# Patient Record
Sex: FEMALE | Race: WHITE
[De-identification: names, ages, dates, MRNs, and addresses within clinical notes are randomized per-mention and may not be internally consistent; named-entity substitution may affect disease eponyms.]

---

## 2017-04-20 ENCOUNTER — HOSPITAL ENCOUNTER (EMERGENCY)
Dept: HOSPITAL 31 - C.EROB | Age: 32
Discharge: HOME | End: 2017-04-20
Payer: COMMERCIAL

## 2017-04-20 ENCOUNTER — HOSPITAL ENCOUNTER (EMERGENCY)
Dept: HOSPITAL 31 - C.EROB | Age: 32
LOS: 1 days | Discharge: LEFT BEFORE DISCHARGE | End: 2017-04-21
Payer: COMMERCIAL

## 2017-04-20 VITALS
SYSTOLIC BLOOD PRESSURE: 146 MMHG | TEMPERATURE: 98.3 F | DIASTOLIC BLOOD PRESSURE: 97 MMHG | OXYGEN SATURATION: 100 % | RESPIRATION RATE: 20 BRPM | HEART RATE: 73 BPM

## 2017-04-20 VITALS — BODY MASS INDEX: 40.2 KG/M2

## 2017-04-20 DIAGNOSIS — O16.3: Primary | ICD-10-CM

## 2017-04-20 DIAGNOSIS — Z3A.38: ICD-10-CM

## 2017-04-20 LAB
ALBUMIN/GLOB SERPL: 1 {RATIO} (ref 1–2.1)
ALP SERPL-CCNC: 152 U/L (ref 38–126)
ALT SERPL-CCNC: 29 U/L (ref 9–52)
APTT BLD: 22 SECONDS (ref 21–34)
AST SERPL-CCNC: 29 U/L (ref 14–36)
BACTERIA #/AREA URNS HPF: (no result) /[HPF]
BASOPHILS # BLD AUTO: 0.1 K/UL (ref 0–0.2)
BASOPHILS NFR BLD: 0.7 % (ref 0–2)
BILIRUB SERPL-MCNC: 0.2 MG/DL (ref 0.2–1.3)
BILIRUB UR-MCNC: NEGATIVE MG/DL
BUN SERPL-MCNC: 11 MG/DL (ref 7–17)
CALCIUM SERPL-MCNC: 8.1 MG/DL (ref 8.6–10.4)
CHLORIDE SERPL-SCNC: 103 MMOL/L (ref 98–107)
CO2 SERPL-SCNC: 22 MMOL/L (ref 22–30)
EOSINOPHIL # BLD AUTO: 0.1 K/UL (ref 0–0.7)
EOSINOPHIL NFR BLD: 1 % (ref 0–4)
ERYTHROCYTE [DISTWIDTH] IN BLOOD BY AUTOMATED COUNT: 14.2 % (ref 11.5–14.5)
GLOBULIN SER-MCNC: 2.9 GM/DL (ref 2.2–3.9)
GLUCOSE SERPL-MCNC: 87 MG/DL (ref 65–105)
GLUCOSE UR STRIP-MCNC: NORMAL MG/DL
HCT VFR BLD CALC: 28.9 % (ref 34–47)
INR PPP: 0.9
KETONES UR STRIP-MCNC: NEGATIVE MG/DL
LEUKOCYTE ESTERASE UR-ACNC: (no result) LEU/UL
LYMPHOCYTES # BLD AUTO: 1.8 K/UL (ref 1–4.3)
LYMPHOCYTES NFR BLD AUTO: 19.9 % (ref 20–40)
MCH RBC QN AUTO: 25.4 PG (ref 27–31)
MCHC RBC AUTO-ENTMCNC: 33.3 G/DL (ref 33–37)
MCV RBC AUTO: 76.2 FL (ref 81–99)
MONOCYTES # BLD: 0.6 K/UL (ref 0–0.8)
MONOCYTES NFR BLD: 6.7 % (ref 0–10)
NRBC BLD AUTO-RTO: 0.1 % (ref 0–2)
PH UR STRIP: 6 [PH] (ref 5–8)
PLATELET # BLD: 126 K/UL (ref 130–400)
PMV BLD AUTO: 11.6 FL (ref 7.2–11.7)
POTASSIUM SERPL-SCNC: 3.7 MMOL/L (ref 3.6–5.2)
PROT SERPL-MCNC: 6 G/DL (ref 6.3–8.3)
PROT UR STRIP-MCNC: NEGATIVE MG/DL
RBC # UR STRIP: (no result) /UL
SODIUM SERPL-SCNC: 133 MMOL/L (ref 132–148)
SP GR UR STRIP: 1.02 (ref 1–1.03)
UROBILINOGEN UR-MCNC: NORMAL MG/DL (ref 0.2–1)
WBC # BLD AUTO: 8.8 K/UL (ref 4.8–10.8)
WBC #/AREA URNS HPF: 1 /HPF (ref 0–5)

## 2017-04-20 NOTE — C.PDOC
Time Seen by Provider: 04/20/17 23:58


Chief Complaint (Nursing): High Blood Pressure





Past Medical History


Vital Signs: 





 Last Vital Signs











Temp  98.3 F   04/20/17 23:48


 


Pulse  73   04/20/17 23:48


 


Resp  20   04/20/17 23:48


 


BP  146/97 H  04/20/17 23:48


 


Pulse Ox  100   04/20/17 23:48














- Medical History


PMH: Fractures (RT.ARM CASTED  CHILDHOOD), HTN, Hypercholesterolemia, Sleep 

Apnea (USES C-PAP)


   Denies: Chronic Kidney Disease


Surgical History: Endoscopy





- [facility] Procedures











ESOPHAGOGASTRODUODENOSCOPY [EGD] W/CLOSED BIOPSY (05/14/15)








Family History: States: Unknown Family Hx





- Social History


Hx Alcohol Use: Yes


Hx Substance Use: No





ED Course And Treatment


O2 Sat by Pulse Oximetry: 100





Disposition


Counseled Patient/Family Regarding: Studies Performed, Diagnosis





- Disposition


Disposition Time: 23:59

## 2017-04-20 NOTE — OBHP
===========================

Datetime: 04/20/2017 15:16

===========================

   

IP Adm Impression:  Term, intrauterine pregnancy; No Active Labor

IP Chief Complaint Other:  P2 at 38weeks 4days Gestation sent for elevated BP in the office for monit
oring and to rule out Preeclampsia

IP Admit Plan:  Observation/Evaluation

Admit Comment, IP Provider:  Chronic Hypertension rule out Superimposed Preeclampsia.

Pelvic Type - PN:  Adequate

Extremities - PN:  Normal

Abdomen - PN:  Normal

Back - PN:  Normal

Breast - PN:  Not Done

Lungs - PN:  Normal

Heart - PN:  Normal

Thyroid - PN:  Not Done

Neurologic - PN:  Not Done

HEENT - PN:  Not Done

General - PN:  Normal

Fetal Weight - Estimated:  3200

Fetal Presentation-Admit:  Vertex

FHR - Baseline A Provider:  130

Membranes, Provider:  Intact

Contraction Comments Provider:  0

Gestation - Est Wks by US:  38.0

Vital Signs Provider:  Reviewed; Within Normal Limits

NICHD Variability Prov Fetus A:  Moderate 6-25bpm

FHR Category Provider Fetus A:  Category I

NICHD Decel Fetus A IP Provider:  None

Genitourinary Exam:  Normal

DTRs - PN:  Normal

## 2017-04-21 NOTE — OBHP
===========================

Datetime: 04/21/2017 01:45

===========================

   

IP Adm Impression:  Term, intrauterine pregnancy; No Active Labor; Intact Membranes

IP Chief Complaint Other:  31 year old P2 at 38 weeks 5 days complaining of /100 at home. Emmanuel paul was seen yesterday from clinic with elevated BP in the office but all BPs were normal in [facility]. Avelina
ent denies headache, no blurry vision. Patient has a history of Chronic Hypertension 

IP Admit Plan Other:  Discharged Against Medical Advice

Admit Comment, IP Provider:  BPs normal. Discussed with Dr. Villavicencio who recommended labor induction. 
However, patient refused and signed out against medical advice. Dr. Villavicencio aware. Will follow up 4/2
3/17.

Pelvic Type - PN:  Adequate

Extremities - PN:  Normal

Abdomen - PN:  Normal

Back - PN:  Normal

Breast - PN:  Not Done

Lungs - PN:  Normal

Heart - PN:  Normal

Thyroid - PN:  Not Done

Neurologic - PN:  Not Done

HEENT - PN:  Not Done

General - PN:  Normal

FHR - Baseline A Provider:  120

Gestation - Est Wks by US:  38.0

EGA AdmitDate IP:  38.5

Vital Signs Provider:  Reviewed; Within Normal Limits

IP Chief Complaint:  Signs/Symptoms Gestational HTN

NICHD Variability Prov Fetus A:  Moderate 6-25bpm

NICHD Accel Fetus A IP Provider:  15X15

FHR Category Provider Fetus A:  Category I

NICHD Decel Fetus A IP Provider:  None

Genitourinary Exam:  Normal

DTRs - PN:  Normal

## 2017-04-23 ENCOUNTER — HOSPITAL ENCOUNTER (INPATIENT)
Dept: HOSPITAL 31 - C.4D | Age: 32
LOS: 3 days | Discharge: HOME | End: 2017-04-26
Attending: OBSTETRICS & GYNECOLOGY | Admitting: OBSTETRICS & GYNECOLOGY
Payer: COMMERCIAL

## 2017-04-23 VITALS — BODY MASS INDEX: 32.2 KG/M2

## 2017-04-23 DIAGNOSIS — O13.3: Primary | ICD-10-CM

## 2017-04-23 DIAGNOSIS — Z3A.39: ICD-10-CM

## 2017-04-23 LAB
ALBUMIN/GLOB SERPL: 1 {RATIO} (ref 1–2.1)
ALP SERPL-CCNC: 179 U/L (ref 38–126)
ALT SERPL-CCNC: 15 U/L (ref 9–52)
AST SERPL-CCNC: 25 U/L (ref 14–36)
BACTERIA #/AREA URNS HPF: (no result) /[HPF]
BASOPHILS # BLD AUTO: 0.1 K/UL (ref 0–0.2)
BASOPHILS NFR BLD: 0.6 % (ref 0–2)
BILIRUB SERPL-MCNC: 0.1 MG/DL (ref 0.2–1.3)
BILIRUB UR-MCNC: NEGATIVE MG/DL
BUN SERPL-MCNC: 12 MG/DL (ref 7–17)
CALCIUM SERPL-MCNC: 8.1 MG/DL (ref 8.6–10.4)
CHLORIDE SERPL-SCNC: 103 MMOL/L (ref 98–107)
CO2 SERPL-SCNC: 20 MMOL/L (ref 22–30)
COLOR UR: YELLOW
EOSINOPHIL # BLD AUTO: 0.1 K/UL (ref 0–0.7)
EOSINOPHIL NFR BLD: 0.7 % (ref 0–4)
ERYTHROCYTE [DISTWIDTH] IN BLOOD BY AUTOMATED COUNT: 14.3 % (ref 11.5–14.5)
GLOBULIN SER-MCNC: 3 GM/DL (ref 2.2–3.9)
GLUCOSE SERPL-MCNC: 79 MG/DL (ref 65–105)
GLUCOSE UR STRIP-MCNC: NORMAL MG/DL
HCT VFR BLD CALC: 29.9 % (ref 34–47)
KETONES UR STRIP-MCNC: NEGATIVE MG/DL
LEUKOCYTE ESTERASE UR-ACNC: (no result) LEU/UL
LYMPHOCYTES # BLD AUTO: 1.6 K/UL (ref 1–4.3)
LYMPHOCYTES NFR BLD AUTO: 19.9 % (ref 20–40)
MCH RBC QN AUTO: 24.6 PG (ref 27–31)
MCHC RBC AUTO-ENTMCNC: 32.2 G/DL (ref 33–37)
MCV RBC AUTO: 76.4 FL (ref 81–99)
MONOCYTES # BLD: 0.4 K/UL (ref 0–0.8)
MONOCYTES NFR BLD: 4.9 % (ref 0–10)
NRBC BLD AUTO-RTO: 0 % (ref 0–2)
PH UR STRIP: 6 [PH] (ref 5–8)
PLATELET # BLD: 130 K/UL (ref 130–400)
PMV BLD AUTO: 11.3 FL (ref 7.2–11.7)
POTASSIUM SERPL-SCNC: 3.8 MMOL/L (ref 3.6–5.2)
PROT SERPL-MCNC: 6 G/DL (ref 6.3–8.3)
PROT UR STRIP-MCNC: (no result) MG/DL
RBC # UR STRIP: (no result) /UL
RBC #/AREA URNS HPF: 10 /HPF (ref 0–3)
SODIUM SERPL-SCNC: 134 MMOL/L (ref 132–148)
SP GR UR STRIP: 1.03 (ref 1–1.03)
UROBILINOGEN UR-MCNC: NORMAL MG/DL (ref 0.2–1)
WBC # BLD AUTO: 8.2 K/UL (ref 4.8–10.8)
WBC #/AREA URNS HPF: 237 /HPF (ref 0–5)
WBC CLUMPS # UR AUTO: (no result) /HPF

## 2017-04-23 NOTE — OBHP
===========================

Datetime: 2017 19:22

===========================

   

IP Adm Impression:  Term, intrauterine pregnancy; No Active Labor

IP Chief Complaint Other:  H/o elevated BPs in the current pregnancy

IP Adm Impression Other:  Gestational Hypertension

IP Admit Plan:  Admit to unit; Initiate labor protocol

Admit Comment, IP Provider:  30yo  with IUP at 39wks, with h/o elevated BPs in current pregnancy,
 reporting here today for IOL. Pt denies headache, dizziness, blurry vision or epigatric tenderness. 
Also denies VB or LOF and feels good fetal movements. 

   TOCO- irregular, FHR- Category 1, Cx: 1.5/30/-3, Vtx

   Assessment: IUP at 39wKs

   Gestational Hypertension.

   Plan: Admit to LND. 

   Induction of labor

Pelvic Type - PN:  Adequate

Extremities - PN:  Normal

Abdomen - PN:  Normal

Back - PN:  Normal

Breast - PN:  Normal

Lungs - PN:  Normal

Heart - PN:  Normal

Thyroid - PN:  Normal

Neurologic - PN:  Normal

HEENT - PN:  Normal

General - PN:  Normal

Fetal Presentation-Admit:  Vertex

FHR - Baseline A Provider:  150

Contraction Comments Provider:  irregular

Comments, ACOG Physical Exam:  Abd: soft,NT, BS- present

      

Gestation - Est Wks by US:  39.0

EGA AdmitDate IP:  39.0

IP Chief Complaint:  Scheduled induction of labor

NICHD Variability Prov Fetus A:  Moderate 6-25bpm

NICHD Accel Fetus A IP Provider:  15X15

NICHD Decel Fetus A IP Provider:  None

Dilatation, Provider:  2

Effacement, Provider:  30

Station, Provider:  -3

Genitourinary Exam:  Normal

DTRs - PN:  Normal

## 2017-04-23 NOTE — OBADHP
===========================

Datetime: 2017 19:22

===========================

   

IP Chief Complaint Other:  H/o elevated BPs in the current pregnancy

IP Adm Impression Other:  Gestational Hypertension

Admit Comment, IP Provider:  30yo  with IUP at 39wks, with h/o elevated BPs in current pregnancy,
 reporting here today for IOL. Pt denies headache, dizziness, blurry vision or epigatric tenderness. 
Also denies VB or LOF and feels good fetal movements. 

   TOCO- irregular, FHR- Category 1, Cx: 1.5/30/-3, Vtx

   Assessment: IUP at 39wKs

   Gestational Hypertension.

   Plan: Admit to LND. 

   Induction of labor

Pelvic Type - PN:  Adequate

Extremities - PN:  Normal

Abdomen - PN:  Normal

Back - PN:  Normal

Breast - PN:  Normal

Lungs - PN:  Normal

Heart - PN:  Normal

Thyroid - PN:  Normal

Neurologic - PN:  Normal

HEENT - PN:  Normal

General - PN:  Normal

Fetal Presentation-Admit:  Vertex

FHR - Baseline A Provider:  150

Contraction Comments Provider:  irregular

Comments, ACOG Physical Exam:  Abd: soft,NT, BS- present

      

Gestation - Est Wks by US:  39.0

IP Chief Complaint:  Scheduled induction of labor

NICHD Variability Prov Fetus A:  Moderate 6-25bpm

NICHD Accel Fetus A IP Provider:  15X15

NICHD Decel Fetus A IP Provider:  None

Dilatation, Provider:  2

Effacement, Provider:  30

Station, Provider:  -3

Genitourinary Exam:  Normal

DTRs - PN:  Normal

EGA AdmitDate IP:  39.0

IP Adm Impression:  Term, intrauterine pregnancy; No Active Labor

IP Admit Plan:  Admit to unit; Initiate labor protocol

   

===========================

Datetime: 2017 01:45

===========================

   

IP Admit Plan Other:  Discharged Against Medical Advice

Vital Signs Provider:  Reviewed; Within Normal Limits

FHR Category Provider Fetus A:  Category I

   

===========================

Datetime: 2017 15:16

===========================

   

Fetal Weight - Estimated:  3200

Membranes, Provider:  Intact

## 2017-04-24 PROCEDURE — 10907ZC DRAINAGE OF AMNIOTIC FLUID, THERAPEUTIC FROM PRODUCTS OF CONCEPTION, VIA NATURAL OR ARTIFICIAL OPENING: ICD-10-PCS | Performed by: OBSTETRICS & GYNECOLOGY

## 2017-04-24 NOTE — OBDS
===================================

DELIVERY PERSONNEL

===================================

   

Delivery Doctor:  WILLY Powell MD

Scrub Nurse:  Sara Mckeon

Circulator:  Krista Dickerson RN

   

===================================

MATERNAL INFORMATION

===================================

   

Delivery Anesthesia:  None

Medications in Delivery:  PITOCIN

Estimated  Blood Loss (ml):  200

Placenta Cultured:  No

Maternal Complications:  None

RN Comments:  ALIVE MALE INFANT DELIVERED WITH INTACT PERINUEIM. DR POWELL ATTENDED DELIVERY. A/S , WT 6-9. INFANT ATTENDED BY BRENDA REYES, RN

Provider Comments:  Uncomplicated Spontaneous vaginal delivery of a viable male infant with APGAR sco
res of 9 and 9.  

      

   Precipitous delivery after 8cm.  I arrived after delivery of baby.

      

   June

   

===================================

LABOR SUMMARY

===================================

   

EDC:  2017 00:00

No. Babies in Womb:  1

 Attempted:  No

Labor Anesthesia:  None

   

===================================

LABOR INFORMATION

===================================

   

Onset of Labor:  2017 23:00

Complete Dilatation:  2017 03:15

Oxytocin:  Induction

Group B Beta Strep:  Negative

Antibiotics Time of Last Dose:  NONE

Steroids Given:  None

Reason Steroids Not Administered:  Not Applicable

   

===================================

MEMBRANES

===================================

   

Membranes Rupture Method:  Artificial

Rupture of Membranes:  2017 00:05

Length of Rupture (hrs):  3.25

Amniotic Fluid Color:  Clear

Amniotic Fluid Amount:  Moderate

Amniotic Fluid Odor:  Normal

   

===================================

STAGES OF LABOR

===================================

   

Stage 1 hrs:  -19

Stage 1 min:  -45

Stage 2 hrs:  0

Stage 2 min:  5

Stage 3 hrs:  0

Stage 3 min:  8

Total Time in Labor hrs:  -19

Total Time in Labor min:  -32

   

===================================

VAGINAL DELIVERY

===================================

   

Episiotomy:  None

Laceration Extension:  N/A

Laceration Type:  None

Laceration Repair:  Not Applicable

Initial Vag Sponge Count:  10+1

Final Vag Sponge Count:  10+1

Initial Vag Sharps Count:  0

Final Vag Sharps Count:  0

Sponge Count Correct:  Yes; Vaginal Sweep Performed

Sharps Count Correct:  Yes

   

===================================

BABY A INFORMATION

===================================

   

Infant Delivery Date/Time:  2017 03:20

Method of Delivery:  Vaginal

Born in Route :  No

:  N/A

Forceps:  N/A

Vacuum Extraction:  N/A

Shoulder Dystocia :  No

   

===================================

SHOULDER DYSTOCIA BABY A

===================================

   

Infant Delivery Date/Time:  2017 03:20

   

===================================

PRESENTATION/POSITION BABY A

===================================

   

Presentation:  Cephalic

Cephalic Presentation:  Vertex

Vertex Position:  Right Occipital Anterior

Breech Presentation:  N/A

   

===================================

PLACENTA INFORMATION BABY A

===================================

   

Placenta Delivery Time :  2017 03:28

Placenta Method of Delivery:  Spontaneous

Placenta Status:  Delivered

   

===================================

APGAR SCORES BABY A

===================================

   

Heart Rate 1 min:  >100 bpm

Resp Effort 1 min:  Good Cry

Reflex Irritability 1 min:  Cough or Sneeze or Pulls Away

Muscle Tone 1 min:  Active Motion

Color 1 min:  Body Pink, Extremities Blue

APGAR SCORE 1 MIN:  9

Heart Rate 5 min:  >100 bpm

Resp Effort 5 min:  Good Cry

Reflex Irritability 5 min:  Cough or Sneeze or Pulls Away

Muscle Tone 5 min:  Active Motion

Color 5 min:  Body Pink, Extremities Blue

APGAR SCORE 5 MIN:  9

   

===================================

INFANT INFORMATION BABY A

===================================

   

Gestational Age at Delivery:  39.1

Gestational Status:  Term

Infant Outcome :  Liveborn

Infant Condition :  Stable

Infant Sex:  Male

   

===================================

IDENTIFICATION/MEDS BABY A

===================================

   

ID Band Number:  [id number]

ID Band Location:  Left Leg; Left Arm



Sensor Applied:  Yes

Sensor Number:  [id number]

Sensor Location :  Cord Clamp

Vitamin K Given :  Not Given

Erythromycin Given:  Not Given

   

===================================

WEIGHT/LENGTH BABY A

===================================

   

Infant Birthweight (gms):  2975

Infant Weight (lb):  6

Infant Weight (oz):  9

Infant Length Inches:  19.00

Infant Length cms:  48.3

   

===================================

CORD INFORMATION BABY A

===================================

   

No. Cord Vessels:  3

Nuchal Cord :  N/A

Cord Blood Taken:  Yes

Infant Suction:  Mouth

   

===================================

ASSESSMENT BABY A

===================================

   

Infant Complications:  None

Physical Findings at Delivery:  Within Normal Limits

Infant Respirations:  Appears Normal

Neonatologist/ALS Called :  No

Transferred To:  Remains with Mother

## 2017-04-24 NOTE — OBDS
===================================

DELIVERY PERSONNEL

===================================

   

Delivery Doctor:  WILLY Powell MD

Scrub Nurse:  Sara Mckeon OBT

Circulator:  Krista Dickerson RN

   

===================================

MATERNAL INFORMATION

===================================

   

Delivery Anesthesia:  None

Medications in Delivery:  PITOCIN

Estimated  Blood Loss (ml):  200

Placenta Cultured:  No

Maternal Complications:  None

Provider Comments:  Uncomplicated Spontaneous vaginal delivery of a viable male infant with APGAR sco
res of 9 and 9.

   

===================================

LABOR SUMMARY

===================================

   

EDC:  2017 00:00

No. Babies in Womb:  1

 Attempted:  No

Labor Anesthesia:  None

   

===================================

LABOR INFORMATION

===================================

   

Onset of Labor:  2017 23:00

Complete Dilatation:  2017 03:15

Oxytocin:  Induction

Group B Beta Strep:  Negative

Antibiotics Time of Last Dose:  NONE

Steroids Given:  None

Reason Steroids Not Administered:  Not Applicable

   

===================================

MEMBRANES

===================================

   

Membranes Rupture Method:  Artificial

Rupture of Membranes:  2017 00:05

Length of Rupture (hrs):  3.25

Amniotic Fluid Color:  Clear

Amniotic Fluid Amount:  Moderate

Amniotic Fluid Odor:  Normal

   

===================================

STAGES OF LABOR

===================================

   

Stage 1 hrs:  -19

Stage 1 min:  -45

Stage 2 hrs:  0

Stage 2 min:  5

Stage 3 hrs:  0

Stage 3 min:  8

Total Time in Labor hrs:  -19

Total Time in Labor min:  -32

   

===================================

VAGINAL DELIVERY

===================================

   

Episiotomy:  None

Laceration Extension:  N/A

Laceration Type:  None

Initial Vag Sponge Count:  10+1

Final Vag Sponge Count:  10+1

Initial Vag Sharps Count:  0

Final Vag Sharps Count:  0

Sponge Count Correct:  Yes; Vaginal Sweep Performed

Sharps Count Correct:  Yes

   

===================================

BABY A INFORMATION

===================================

   

Infant Delivery Date/Time:  2017 03:20

Method of Delivery:  Vaginal

Born in Route :  No

:  N/A

Forceps:  N/A

Vacuum Extraction:  N/A

Shoulder Dystocia :  No

   

===================================

SHOULDER DYSTOCIA BABY A

===================================

   

Infant Delivery Date/Time:  2017 03:20

   

===================================

PRESENTATION/POSITION BABY A

===================================

   

Presentation:  Cephalic

Cephalic Presentation:  Vertex

Vertex Position:  Right Occipital Anterior

Breech Presentation:  N/A

   

===================================

PLACENTA INFORMATION BABY A

===================================

   

Placenta Delivery Time :  2017 03:28

Placenta Method of Delivery:  Spontaneous

Placenta Status:  Delivered

   

===================================

APGAR SCORES BABY A

===================================

   

Heart Rate 1 min:  >100 bpm

Resp Effort 1 min:  Good Cry

Reflex Irritability 1 min:  Cough or Sneeze or Pulls Away

Muscle Tone 1 min:  Active Motion

Color 1 min:  Body Pink, Extremities Blue

APGAR SCORE 1 MIN:  9

Heart Rate 5 min:  >100 bpm

Resp Effort 5 min:  Good Cry

Reflex Irritability 5 min:  Cough or Sneeze or Pulls Away

Muscle Tone 5 min:  Active Motion

Color 5 min:  Body Pink, Extremities Blue

APGAR SCORE 5 MIN:  9

   

===================================

INFANT INFORMATION BABY A

===================================

   

Gestational Age at Delivery:  39.1

Gestational Status:  Term

Infant Outcome :  Liveborn

Infant Condition :  Stable

Infant Sex:  Male

   

===================================

IDENTIFICATION/MEDS BABY A

===================================

   

ID Band Number:  [id number]

ID Band Location:  Left Leg; Left Arm



Sensor Applied:  Yes

Sensor Number:  [id number]

Sensor Location :  Cord Clamp

Vitamin K Given :  Not Given

Erythromycin Given:  Not Given

   

===================================

CORD INFORMATION BABY A

===================================

   

No. Cord Vessels:  3

Nuchal Cord :  N/A

Cord Blood Taken:  Yes

Infant Suction:  Mouth

   

===================================

ASSESSMENT BABY A

===================================

   

Infant Complications:  None

Physical Findings at Delivery:  Within Normal Limits

Infant Respirations:  Appears Normal

Neonatologist/ALS Called :  No

Transferred To:  Remains with Mother

## 2017-04-25 LAB
ERYTHROCYTE [DISTWIDTH] IN BLOOD BY AUTOMATED COUNT: 14.5 % (ref 11.5–14.5)
HCT VFR BLD CALC: 27.6 % (ref 34–47)
MCH RBC QN AUTO: 24.9 PG (ref 27–31)
MCHC RBC AUTO-ENTMCNC: 32.6 G/DL (ref 33–37)
MCV RBC AUTO: 76.3 FL (ref 81–99)
PLATELET # BLD: 109 K/UL (ref 130–400)
PMV BLD AUTO: 11.5 FL (ref 7.2–11.7)
WBC # BLD AUTO: 9.5 K/UL (ref 4.8–10.8)

## 2017-04-25 RX ADMIN — NIFEDIPINE SCH MG: 30 TABLET, FILM COATED, EXTENDED RELEASE ORAL at 11:05

## 2017-04-25 NOTE — OBPPN
===========================

Datetime: 04/25/2017 09:46

===========================

   

PP Pain Prov:  Within normal limits

PP Nausea Prov:  Denies

PP Flatus Prov:  Yes

PP Breasts Prov:  Normal

PP Heart Prov:  Normal

PP Lungs Prov:  Normal

PP Abdomen/Uterus Prov:  Normal

PP Lochia Prov:  Normal

PP Vulva/Perineum Prov:  Normal

PP CVA Tenderness Prov:  Normal

PP Extremities Prov:  Normal

PP Impression Prov:  Normal postpartum progression; Pregnancy Induced Hypertension

PP Plan Prov:  Continue present management

## 2017-04-26 VITALS
SYSTOLIC BLOOD PRESSURE: 146 MMHG | TEMPERATURE: 97.8 F | RESPIRATION RATE: 18 BRPM | OXYGEN SATURATION: 100 % | DIASTOLIC BLOOD PRESSURE: 89 MMHG | HEART RATE: 66 BPM

## 2017-04-26 RX ADMIN — NIFEDIPINE SCH MG: 30 TABLET, FILM COATED, EXTENDED RELEASE ORAL at 09:54

## 2017-04-26 NOTE — OBPPN
===========================

Datetime: 04/26/2017 08:24

===========================

   

PP Pain Prov:  Within normal limits

PP Nausea Prov:  Denies

PP Flatus Prov:  Yes

PP Breasts Prov:  Normal

PP Heart Prov:  Normal

PP Lungs Prov:  Normal

PP Abdomen/Uterus Prov:  Normal

PP Lochia Prov:  Normal

PP Vulva/Perineum Prov:  Normal

PP CVA Tenderness Prov:  Normal

PP Extremities Prov:  Normal

PP Impression Prov:  Normal postpartum progression

PP Plan Prov:  Discharge

Vital Signs Provider PP:  Reviewed

   

===========================

Datetime: 04/25/2017 09:46

===========================

   

PP Progress Note Prov:  start nifedipine 30mg, continue observation

## 2017-04-26 NOTE — OBDCSUM
===========================

Datetime: 04/26/2017 08:34

===========================

   

Discharged to, Provider:  Home

Follow up at, Provider:  June

Disch Instr Activity:  Normal activity; May Shower

Disch Instr Diet:  Regular

Discharge Instructions, Provider:  Routine instructions given

Discharge Diagnosis, Provider:  Term Pregnancy Delivered

Discharge Time:  04/26/2017 08:35

Follow up in weeks, Provider:  1wk

Disch Referrals:  None

Contraception discussed, Prov:  Yes

Disch Activity Restrictions:  No exercising; No sexual activity; Nothing in vagina - [place], [place], douche

Discharge Diagnosis Prov Other:  PIH

   

===========================

Datetime: 04/21/2017 01:44

===========================

   

Discharged to, Provider:  Home

Follow up at, Provider:  [facility]

Discharge Instructions, Provider:  Routine instructions given

Follow up in weeks, Provider:  1 week

Discharge Instruct Comment, Prov:  check bp bid

Contraception discussed, Prov:  Yes

## 2017-10-01 ENCOUNTER — HOSPITAL ENCOUNTER (EMERGENCY)
Dept: HOSPITAL 31 - C.ER | Age: 32
Discharge: HOME | End: 2017-10-01
Payer: COMMERCIAL

## 2017-10-01 VITALS — TEMPERATURE: 98.2 F

## 2017-10-01 VITALS
SYSTOLIC BLOOD PRESSURE: 146 MMHG | RESPIRATION RATE: 18 BRPM | OXYGEN SATURATION: 98 % | DIASTOLIC BLOOD PRESSURE: 89 MMHG | HEART RATE: 81 BPM

## 2017-10-01 VITALS — BODY MASS INDEX: 32.2 KG/M2
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
## 2017-10-01 DIAGNOSIS — S83.91XA: Primary | ICD-10-CM
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
## 2017-10-01 NOTE — RAD
PROCEDURE:  Right Knee Radiographs.



HISTORY:

Status post fall with right knee pain 



COMPARISON:

None.



FINDINGS:



BONES:

No evidence of acute displaced fracture nor dislocation 



JOINTS:

Joint spaces preserved.  No significant osteoarthritis. 



JOINT EFFUSION:

Moderate-large size suprapatellar joint effusion present. 



OTHER FINDINGS:

None.



IMPRESSION:

The no evidence of acute displaced fracture nor dislocation. 

Moderate- large suprapatellar joint effusion.

## 2017-10-01 NOTE — C.PDOC
History Of Present Illness


32 yr old female presents to the ER for evaluation of right knee pain, s/p trip 

and fall last night. Patient reports minimal ambulation. Patient denies head 

injury, LOC, leg pain, back pain, weakness or numbness. 


Time Seen by Provider: 10/01/17 11:03


Chief Complaint (Nursing): Lower Extremity Problem/Injury


History Per: Patient


History/Exam Limitations: no limitations


Onset/Duration Of Symptoms: Sudden Onset (Last night)


Current Symptoms Are (Timing): Still Present





Past Medical History


Reviewed: Historical Data, Nursing Documentation, Vital Signs


Vital Signs: 


 Last Vital Signs











Temp  98.2 F   10/01/17 10:55


 


Pulse  81   10/01/17 12:54


 


Resp  18   10/01/17 12:54


 


BP  146/89   10/01/17 12:54


 


Pulse Ox  98   10/01/17 15:30














- Medical History


PMH: Fractures (RT.ARM CASTED  CHILDHOOD), HTN, Hypercholesterolemia, Sleep 

Apnea (USES C-PAP)


Surgical History: Endoscopy





- [facility] Procedures








DELIVERY OF PRODUCTS OF CONCEPTION, EXTERNAL APPROACH (04/23/17)


DRAINAGE OF AMNIOTIC FL, THERAP FROM POC, VIA OPENING (04/23/17)


ESOPHAGOGASTRODUODENOSCOPY [EGD] W/CLOSED BIOPSY (05/14/15)








Family History: States: No Known Family Hx





- Social History


Hx Alcohol Use: Yes


Hx Substance Use: No





Review Of Systems


Except As Marked, All Systems Reviewed And Found Negative.


Musculoskeletal: Positive for: Other ((+) Right knee pain.).  Negative for: 

Back Pain, Leg Pain


Neurological: Negative for: Weakness, Numbness





Physical Exam





- Physical Exam


Appears: Non-toxic, No Acute Distress


Skin: Warm, Dry, No Rash


Head: Atraumatic, Normacephalic


Chest: Symmetrical, No Tenderness


Cardiovascular: Rhythm Regular, No Murmur


Respiratory: Normal Breath Sounds, No Rales, No Rhonchi, No Stridor, No Wheezing


Extremity: Normal ROM, Tenderness (Right Knee - Diffuse tenderness.)


Pulses: Left Dorsalis Pedis: Normal, Right Dorsalis Pedis: Normal


Neurological/Psych: Oriented x3, Normal Speech, Normal Motor, Normal Sensation





ED Course And Treatment


O2 Sat by Pulse Oximetry: 98 (RA)


Pulse Ox Interpretation: Normal





- Other Rad


  ** X-Ray - Right Knee


X-Ray: Viewed By Me, Read By Radiologist


Interpretation: PROCEDURE:  Right Knee Radiographs.  HISTORY:  Status post fall 

with right knee pain.  COMPARISON:  None.  FINDINGS:  BONES:  No evidence of 

acute displaced fracture nor dislocation.  JOINTS:  Joint spaces preserved.  No 

significant osteoarthritis.  JOINT EFFUSION:  Moderate-large size suprapatellar 

joint effusion present.  OTHER FINDINGS:  None.  IMPRESSION:  The no evidence 

of acute displaced fracture nor dislocation. Moderate- large suprapatellar 

joint effusion.





Medical Decision Making


Medical Decision Making: 


PLAN:


* X-Ray - Right Knee w/ Patella 


* Motrin PO 








Disposition





- Disposition


Disposition: HOME/ ROUTINE


Disposition Time: 12:20


Condition: GOOD


Additional Instructions: 





Thank you for letting us take care of you today. Your provider was Dr. Spring. You were treated for knee sprain. The emergency medical care you 

received today was directed at your acute symptoms. If you were prescribed any 

medication, please fill it and take as directed. It may take several days for 

your symptoms to resolve. Return to the Emergency Department if your symptoms 

worsen, do not improve, or if you have any other problems.





Please contact your doctor or call one of the physicians/clinics you have been 

referred to that are listed on the Patient Visit Information form that is 

included in your discharge packet. Bring any paperwork you were given at 

discharge with you along with any medications you are taking to your follow up 

visit. Our treatment cannot replace ongoing medical care by a primary care 

provider (PCP) outside of the emergency department.





Thank you for allowing the [facility] beds at the [facility] team to be part of your care today.











Follow up with your doctor in 2-3 days for further evaluation and management.


Prescriptions: 


Ibuprofen [Motrin] 600 mg PO Q6 PRN #20 tab


 PRN Reason: Pain, Moderate (4-7)


Instructions:  Knee Sprain (ED), Crutch Instructions (ED)


Forms:  Work Excuse





- Clinical Impression


Clinical Impression: 


 Knee sprain








- Scribe Statement


The provider has reviewed the documentation as recorded by the [facility] [employer] [facility]


Provider Attestation: 


All medical record entries made by the [employer] were at my direction and 

personally dictated by me. I have reviewed the chart and agree that the record 

accurately reflects my personal performance of the history, physical exam, 

medical decision making, and the department course for this patient. I have 

also personally directed, reviewed, and agree with the discharge instructions 

and disposition.

## 2019-05-29 ENCOUNTER — HOSPITAL ENCOUNTER (EMERGENCY)
Dept: HOSPITAL 14 - H.ER | Age: 34
Discharge: HOME | End: 2019-05-29
Payer: COMMERCIAL

## 2019-05-29 VITALS — RESPIRATION RATE: 16 BRPM | TEMPERATURE: 99.2 F | OXYGEN SATURATION: 100 %

## 2019-05-29 VITALS — DIASTOLIC BLOOD PRESSURE: 97 MMHG | SYSTOLIC BLOOD PRESSURE: 141 MMHG | HEART RATE: 79 BPM

## 2019-05-29 VITALS — BODY MASS INDEX: 32.2 KG/M2

## 2019-05-29 DIAGNOSIS — I10: Primary | ICD-10-CM
